# Patient Record
Sex: MALE | URBAN - METROPOLITAN AREA
[De-identification: names, ages, dates, MRNs, and addresses within clinical notes are randomized per-mention and may not be internally consistent; named-entity substitution may affect disease eponyms.]

---

## 2018-08-14 ENCOUNTER — TELEPHONE (OUTPATIENT)
Dept: ORTHOPEDIC SURGERY | Age: 53
End: 2018-08-14

## 2018-08-14 NOTE — TELEPHONE ENCOUNTER
SPOKE WITH PATIENT YESTERDAY ABOUT RIGHT HAND PAIN. PATIENT WAS GIVEN APPOINTMENT TODAY AT 8:30AM WITH DR Patrick Bajwa. PATIENT WAS MADE AWARE OF LOCATION AND WAS GIVEN PHONE NUMBER FOR THE OFFICE TO CALL.  PATIENT WAS A NO SHOW FOR TODAYS APPOINTMENT WITH DR Patrick Bajwa